# Patient Record
Sex: MALE | Race: WHITE | NOT HISPANIC OR LATINO | URBAN - METROPOLITAN AREA
[De-identification: names, ages, dates, MRNs, and addresses within clinical notes are randomized per-mention and may not be internally consistent; named-entity substitution may affect disease eponyms.]

---

## 2018-07-30 ENCOUNTER — OUTPATIENT (OUTPATIENT)
Dept: OUTPATIENT SERVICES | Facility: HOSPITAL | Age: 78
LOS: 1 days | End: 2018-07-30
Payer: MEDICARE

## 2018-07-30 DIAGNOSIS — I25.10 ATHEROSCLEROTIC HEART DISEASE OF NATIVE CORONARY ARTERY WITHOUT ANGINA PECTORIS: ICD-10-CM

## 2018-07-30 PROCEDURE — 93018 CV STRESS TEST I&R ONLY: CPT

## 2018-07-30 PROCEDURE — 93306 TTE W/DOPPLER COMPLETE: CPT | Mod: 26

## 2018-07-30 PROCEDURE — 93306 TTE W/DOPPLER COMPLETE: CPT

## 2018-07-30 PROCEDURE — A9500: CPT

## 2018-07-30 PROCEDURE — 93017 CV STRESS TEST TRACING ONLY: CPT

## 2018-07-30 PROCEDURE — 78452 HT MUSCLE IMAGE SPECT MULT: CPT

## 2018-07-30 PROCEDURE — 78452 HT MUSCLE IMAGE SPECT MULT: CPT | Mod: 26

## 2018-07-30 PROCEDURE — 93016 CV STRESS TEST SUPVJ ONLY: CPT

## 2018-07-30 PROCEDURE — A9505: CPT

## 2018-09-19 ENCOUNTER — APPOINTMENT (OUTPATIENT)
Dept: CT IMAGING | Facility: HOSPITAL | Age: 78
End: 2018-09-19

## 2018-09-19 ENCOUNTER — OUTPATIENT (OUTPATIENT)
Dept: OUTPATIENT SERVICES | Facility: HOSPITAL | Age: 78
LOS: 1 days | End: 2018-09-19
Payer: COMMERCIAL

## 2018-09-19 PROCEDURE — 75571 CT HRT W/O DYE W/CA TEST: CPT

## 2018-09-19 PROCEDURE — 75571 CT HRT W/O DYE W/CA TEST: CPT | Mod: 26

## 2018-10-18 VITALS
HEART RATE: 60 BPM | OXYGEN SATURATION: 96 % | HEIGHT: 68 IN | DIASTOLIC BLOOD PRESSURE: 61 MMHG | RESPIRATION RATE: 17 BRPM | TEMPERATURE: 98 F | SYSTOLIC BLOOD PRESSURE: 112 MMHG | WEIGHT: 195.11 LBS

## 2018-10-18 NOTE — H&P ADULT - PMH
BPH (benign prostatic hyperplasia)    DM (diabetes mellitus)    HLD (hyperlipidemia)    HTN (hypertension)    Myocardial infarction    Spinal stenosis

## 2018-10-18 NOTE — H&P ADULT - ASSESSMENT
79 y/o M with PMHx HTN, HLD, diet-controlled DM (on no DM meds), CAD with h/o silent MI 25 yrs ago (no PCI) who presents cardiac catheterization secondary to CCS III anginal equivalent symptoms in the setting of an abnormal CTA      ASA III Mallampati III  Precath consented         Risks & benefits of procedure and alternative therapy have been explained to the patient including but not limited to: allergic reaction, bleeding w/possible need for blood transfusion, infection, renal and vascular compromise, limb damage, arrhythmia, stroke, vessel dissection/perforation, Myocardial infarction, emergent CABG. Informed consent obtained and in chart. 79 y/o M with PMHx HTN, HLD, diet-controlled DM (on no DM meds), CAD with h/o silent MI 25 yrs ago (no PCI) who presents cardiac catheterization secondary to CCS III anginal equivalent symptoms in the setting of an abnormal CTA      ASA III Mallampati III  Precath consented   Loaded with Plavix 600mg POx1 and ASA 81mg POx1  Started IVF NS @ 75cc/h         Risks & benefits of procedure and alternative therapy have been explained to the patient including but not limited to: allergic reaction, bleeding w/possible need for blood transfusion, infection, renal and vascular compromise, limb damage, arrhythmia, stroke, vessel dissection/perforation, Myocardial infarction, emergent CABG. Informed consent obtained and in chart.

## 2018-10-18 NOTE — H&P ADULT - HISTORY OF PRESENT ILLNESS
*Confirm meds    79 y/o M with PMHx HTN, HLD, diet-controlled DM (on no DM meds), CAD with h/o silent MI 25 yrs ago (no PCI) who presented to his cardiologist Dr. Garcia complaining of SOB on exertion.   CT Chest performed 9/21/18 showed mild aneurysmal dilatation of the aortic root measuring 4.3cm. CTA coronaries performed 9/19/18 showed calcium score severe at 4337 Agatston units with atherosclerotic disease of the aorta. Echocardiogram performed 7/30/18 showed normal LV wall size and thickness, on some views inferior/inferolateral region appear hypokinetic, normal motion in remaining regions, LVEF 45-50%, mild to moderate MR. Stress test performed 7/30/18 showed moderately abnormal myocardial perfusion images consistent with scarring in the base of the inferior and inferolateral walls, overall LV systolic function normal without regional wall motion abnormalities.   In light of patient’s risk factors, CCS Class III angina equivalent symptoms, and abnormal CTA coronaries with elevated calcium score, the patient is recommended for cardiac catheterization with possible intervention if clinically indicated. *Confirm meds    77 y/o M with PMHx HTN, HLD, diet-controlled DM (on no DM meds), CAD with h/o silent MI 25 yrs ago (no PCI) who presented to his cardiologist Dr. Garcia complaining of SOB on exertion. The patient reports a limited exercise tolerance due to trouble walking secondary to spinal issues, however he reports recently feeling significant SOB after climbing 1 flight of stairs, relieved with rest.  He had CT Chest performed 9/21/18 showed mild aneurysmal dilatation of the aortic root measuring 4.3cm. CTA coronaries performed 9/19/18 showed calcium score severe at 4337 Agatston units with atherosclerotic disease of the aorta. Echocardiogram performed 7/30/18 showed normal LV wall size and thickness, on some views inferior/inferolateral region appear hypokinetic, normal motion in remaining regions, LVEF 45-50%, mild to moderate MR. Stress test performed 7/30/18 showed moderately abnormal myocardial perfusion images consistent with scarring in the base of the inferior and inferolateral walls, overall LV systolic function normal without regional wall motion abnormalities.   In light of patient’s risk factors, CCS Class III angina equivalent symptoms, and abnormal CTA coronaries with elevated calcium score, the patient is recommended for cardiac catheterization with possible intervention if clinically indicated. *Confirm meds    79 y/o M with PMHx HTN, HLD, diet-controlled DM (on no DM meds), CAD with h/o silent MI 25 yrs ago (no PCI) who presented to his cardiologist Dr. Garcia complaining of SOB on exertion. The patient reports a limited exercise tolerance due to trouble walking secondary to spinal issues, however he reports recently feeling significant SOB after climbing 1 flight of stairs, relieved with rest. He denies CP, palpitations, n/v, diaphoresis, dizziness, LE edema, orthopnea, or syncope. He had CT Chest performed 9/21/18 showed mild aneurysmal dilatation of the aortic root measuring 4.3cm. CTA coronaries performed 9/19/18 showed calcium score severe at 4337 Agatston units with atherosclerotic disease of the aorta. Echocardiogram performed 7/30/18 showed normal LV wall size and thickness, on some views inferior/inferolateral region appear hypokinetic, normal motion in remaining regions, LVEF 45-50%, mild to moderate MR. Stress test performed 7/30/18 showed moderately abnormal myocardial perfusion images consistent with scarring in the base of the inferior and inferolateral walls, overall LV systolic function normal without regional wall motion abnormalities.   In light of patient’s risk factors, CCS Class III angina equivalent symptoms, and abnormal CTA coronaries with elevated calcium score, the patient is recommended for cardiac catheterization with possible intervention if clinically indicated. 79 y/o M with PMHx HTN, HLD, diet-controlled DM (on no DM meds), CAD with h/o silent MI 25 yrs ago (no PCI) who presented to his cardiologist Dr. Garcia complaining of SOB on exertion. The patient reports a limited exercise tolerance due to trouble walking secondary to spinal issues, however he reports recently feeling significant SOB after climbing 1 flight of stairs, relieved with rest. He denies CP, palpitations, n/v, diaphoresis, dizziness, LE edema, orthopnea, or syncope. He had CT Chest performed 9/21/18 showed mild aneurysmal dilatation of the aortic root measuring 4.3cm. CTA coronaries performed 9/19/18 showed calcium score severe at 4337 Agatston units with atherosclerotic disease of the aorta. Echocardiogram performed 7/30/18 showed normal LV wall size and thickness, on some views inferior/inferolateral region appear hypokinetic, normal motion in remaining regions, LVEF 45-50%, mild to moderate MR. Stress test performed 7/30/18 showed moderately abnormal myocardial perfusion images consistent with scarring in the base of the inferior and inferolateral walls, overall LV systolic function normal without regional wall motion abnormalities.   In light of patient’s risk factors, CCS Class III angina equivalent symptoms, and abnormal CTA coronaries with elevated calcium score, the patient is recommended for cardiac catheterization with possible intervention if clinically indicated.

## 2018-10-19 ENCOUNTER — INPATIENT (INPATIENT)
Facility: HOSPITAL | Age: 78
LOS: 0 days | Discharge: HOME | DRG: 247 | End: 2018-10-20
Attending: INTERNAL MEDICINE | Admitting: INTERNAL MEDICINE
Payer: COMMERCIAL

## 2018-10-19 DIAGNOSIS — Z90.49 ACQUIRED ABSENCE OF OTHER SPECIFIED PARTS OF DIGESTIVE TRACT: Chronic | ICD-10-CM

## 2018-10-19 DIAGNOSIS — N20.0 CALCULUS OF KIDNEY: Chronic | ICD-10-CM

## 2018-10-19 DIAGNOSIS — J38.1 POLYP OF VOCAL CORD AND LARYNX: Chronic | ICD-10-CM

## 2018-10-19 LAB
ALBUMIN SERPL ELPH-MCNC: 4.5 G/DL — SIGNIFICANT CHANGE UP (ref 3.3–5)
ALP SERPL-CCNC: 59 U/L — SIGNIFICANT CHANGE UP (ref 40–120)
ALT FLD-CCNC: 42 U/L — SIGNIFICANT CHANGE UP (ref 10–45)
ANION GAP SERPL CALC-SCNC: 12 MMOL/L — SIGNIFICANT CHANGE UP (ref 5–17)
APTT BLD: 30.3 SEC — SIGNIFICANT CHANGE UP (ref 27.5–37.4)
AST SERPL-CCNC: 27 U/L — SIGNIFICANT CHANGE UP (ref 10–40)
BASOPHILS NFR BLD AUTO: 0.6 % — SIGNIFICANT CHANGE UP (ref 0–2)
BILIRUB SERPL-MCNC: 0.5 MG/DL — SIGNIFICANT CHANGE UP (ref 0.2–1.2)
BUN SERPL-MCNC: 23 MG/DL — SIGNIFICANT CHANGE UP (ref 7–23)
CALCIUM SERPL-MCNC: 9.8 MG/DL — SIGNIFICANT CHANGE UP (ref 8.4–10.5)
CHLORIDE SERPL-SCNC: 99 MMOL/L — SIGNIFICANT CHANGE UP (ref 96–108)
CHOLEST SERPL-MCNC: 141 MG/DL — SIGNIFICANT CHANGE UP (ref 10–199)
CK MB CFR SERPL CALC: 3.2 NG/ML — SIGNIFICANT CHANGE UP (ref 0–6.7)
CK SERPL-CCNC: 163 U/L — SIGNIFICANT CHANGE UP (ref 30–200)
CO2 SERPL-SCNC: 28 MMOL/L — SIGNIFICANT CHANGE UP (ref 22–31)
CREAT SERPL-MCNC: 0.75 MG/DL — SIGNIFICANT CHANGE UP (ref 0.5–1.3)
CRP SERPL-MCNC: <0.03 MG/DL — SIGNIFICANT CHANGE UP (ref 0–0.4)
EOSINOPHIL NFR BLD AUTO: 7.7 % — HIGH (ref 0–6)
GLUCOSE BLDC GLUCOMTR-MCNC: 96 MG/DL — SIGNIFICANT CHANGE UP (ref 70–99)
GLUCOSE SERPL-MCNC: 158 MG/DL — HIGH (ref 70–99)
HBA1C BLD-MCNC: 6.6 % — HIGH (ref 4–5.6)
HCT VFR BLD CALC: 46.7 % — SIGNIFICANT CHANGE UP (ref 39–50)
HDLC SERPL-MCNC: 52 MG/DL — SIGNIFICANT CHANGE UP
HGB BLD-MCNC: 15.4 G/DL — SIGNIFICANT CHANGE UP (ref 13–17)
INR BLD: 1.02 — SIGNIFICANT CHANGE UP (ref 0.88–1.16)
LIPID PNL WITH DIRECT LDL SERPL: 72 MG/DL — SIGNIFICANT CHANGE UP
LYMPHOCYTES # BLD AUTO: 38.5 % — SIGNIFICANT CHANGE UP (ref 13–44)
MCHC RBC-ENTMCNC: 31.5 PG — SIGNIFICANT CHANGE UP (ref 27–34)
MCHC RBC-ENTMCNC: 33 G/DL — SIGNIFICANT CHANGE UP (ref 32–36)
MCV RBC AUTO: 95.5 FL — SIGNIFICANT CHANGE UP (ref 80–100)
MONOCYTES NFR BLD AUTO: 8.3 % — SIGNIFICANT CHANGE UP (ref 2–14)
NEUTROPHILS NFR BLD AUTO: 44.9 % — SIGNIFICANT CHANGE UP (ref 43–77)
PLATELET # BLD AUTO: 176 K/UL — SIGNIFICANT CHANGE UP (ref 150–400)
POTASSIUM SERPL-MCNC: 4 MMOL/L — SIGNIFICANT CHANGE UP (ref 3.5–5.3)
POTASSIUM SERPL-SCNC: 4 MMOL/L — SIGNIFICANT CHANGE UP (ref 3.5–5.3)
PROT SERPL-MCNC: 7.7 G/DL — SIGNIFICANT CHANGE UP (ref 6–8.3)
PROTHROM AB SERPL-ACNC: 11.3 SEC — SIGNIFICANT CHANGE UP (ref 9.8–12.7)
RBC # BLD: 4.89 M/UL — SIGNIFICANT CHANGE UP (ref 4.2–5.8)
RBC # FLD: 14.1 % — SIGNIFICANT CHANGE UP (ref 10.3–16.9)
SODIUM SERPL-SCNC: 139 MMOL/L — SIGNIFICANT CHANGE UP (ref 135–145)
TOTAL CHOLESTEROL/HDL RATIO MEASUREMENT: 2.7 RATIO — LOW (ref 3.4–9.6)
TRIGL SERPL-MCNC: 84 MG/DL — SIGNIFICANT CHANGE UP (ref 10–149)
WBC # BLD: 4.8 K/UL — SIGNIFICANT CHANGE UP (ref 3.8–10.5)
WBC # FLD AUTO: 4.8 K/UL — SIGNIFICANT CHANGE UP (ref 3.8–10.5)

## 2018-10-19 PROCEDURE — 92928 PRQ TCAT PLMT NTRAC ST 1 LES: CPT | Mod: LC

## 2018-10-19 PROCEDURE — 93010 ELECTROCARDIOGRAM REPORT: CPT

## 2018-10-19 PROCEDURE — 93458 L HRT ARTERY/VENTRICLE ANGIO: CPT | Mod: 26,XU

## 2018-10-19 RX ORDER — ASPIRIN/CALCIUM CARB/MAGNESIUM 324 MG
1 TABLET ORAL
Qty: 0 | Refills: 0 | COMMUNITY

## 2018-10-19 RX ORDER — DEXTROSE 50 % IN WATER 50 %
25 SYRINGE (ML) INTRAVENOUS ONCE
Qty: 0 | Refills: 0 | Status: DISCONTINUED | OUTPATIENT
Start: 2018-10-19 | End: 2018-10-20

## 2018-10-19 RX ORDER — ASPIRIN/CALCIUM CARB/MAGNESIUM 324 MG
81 TABLET ORAL ONCE
Qty: 0 | Refills: 0 | Status: COMPLETED | OUTPATIENT
Start: 2018-10-19 | End: 2018-10-19

## 2018-10-19 RX ORDER — DEXTROSE 50 % IN WATER 50 %
12.5 SYRINGE (ML) INTRAVENOUS ONCE
Qty: 0 | Refills: 0 | Status: DISCONTINUED | OUTPATIENT
Start: 2018-10-19 | End: 2018-10-19

## 2018-10-19 RX ORDER — SODIUM CHLORIDE 9 MG/ML
500 INJECTION INTRAMUSCULAR; INTRAVENOUS; SUBCUTANEOUS
Qty: 0 | Refills: 0 | Status: DISCONTINUED | OUTPATIENT
Start: 2018-10-19 | End: 2018-10-19

## 2018-10-19 RX ORDER — INSULIN LISPRO 100/ML
VIAL (ML) SUBCUTANEOUS
Qty: 0 | Refills: 0 | Status: DISCONTINUED | OUTPATIENT
Start: 2018-10-19 | End: 2018-10-20

## 2018-10-19 RX ORDER — INFLUENZA VIRUS VACCINE 15; 15; 15; 15 UG/.5ML; UG/.5ML; UG/.5ML; UG/.5ML
0.5 SUSPENSION INTRAMUSCULAR ONCE
Qty: 0 | Refills: 0 | Status: DISCONTINUED | OUTPATIENT
Start: 2018-10-19 | End: 2018-10-20

## 2018-10-19 RX ORDER — INSULIN LISPRO 100/ML
VIAL (ML) SUBCUTANEOUS ONCE
Qty: 0 | Refills: 0 | Status: DISCONTINUED | OUTPATIENT
Start: 2018-10-19 | End: 2018-10-19

## 2018-10-19 RX ORDER — GLUCAGON INJECTION, SOLUTION 0.5 MG/.1ML
1 INJECTION, SOLUTION SUBCUTANEOUS ONCE
Qty: 0 | Refills: 0 | Status: DISCONTINUED | OUTPATIENT
Start: 2018-10-19 | End: 2018-10-19

## 2018-10-19 RX ORDER — DEXTROSE 50 % IN WATER 50 %
25 SYRINGE (ML) INTRAVENOUS ONCE
Qty: 0 | Refills: 0 | Status: DISCONTINUED | OUTPATIENT
Start: 2018-10-19 | End: 2018-10-19

## 2018-10-19 RX ORDER — FINASTERIDE 5 MG/1
1 TABLET, FILM COATED ORAL
Qty: 0 | Refills: 0 | COMMUNITY

## 2018-10-19 RX ORDER — SODIUM CHLORIDE 9 MG/ML
1000 INJECTION, SOLUTION INTRAVENOUS
Qty: 0 | Refills: 0 | Status: DISCONTINUED | OUTPATIENT
Start: 2018-10-19 | End: 2018-10-20

## 2018-10-19 RX ORDER — FINASTERIDE 5 MG/1
5 TABLET, FILM COATED ORAL DAILY
Qty: 0 | Refills: 0 | Status: DISCONTINUED | OUTPATIENT
Start: 2018-10-19 | End: 2018-10-20

## 2018-10-19 RX ORDER — CHOLECALCIFEROL (VITAMIN D3) 125 MCG
1 CAPSULE ORAL
Qty: 0 | Refills: 0 | COMMUNITY

## 2018-10-19 RX ORDER — OMEGA-3 ACID ETHYL ESTERS 1 G
1 CAPSULE ORAL
Qty: 0 | Refills: 0 | COMMUNITY

## 2018-10-19 RX ORDER — METOPROLOL TARTRATE 50 MG
25 TABLET ORAL
Qty: 0 | Refills: 0 | Status: DISCONTINUED | OUTPATIENT
Start: 2018-10-19 | End: 2018-10-20

## 2018-10-19 RX ORDER — POTASSIUM CHLORIDE 20 MEQ
1 PACKET (EA) ORAL
Qty: 0 | Refills: 0 | COMMUNITY

## 2018-10-19 RX ORDER — ATORVASTATIN CALCIUM 80 MG/1
80 TABLET, FILM COATED ORAL AT BEDTIME
Qty: 0 | Refills: 0 | Status: DISCONTINUED | OUTPATIENT
Start: 2018-10-19 | End: 2018-10-20

## 2018-10-19 RX ORDER — DEXTROSE 50 % IN WATER 50 %
15 SYRINGE (ML) INTRAVENOUS ONCE
Qty: 0 | Refills: 0 | Status: DISCONTINUED | OUTPATIENT
Start: 2018-10-19 | End: 2018-10-20

## 2018-10-19 RX ORDER — GLUCAGON INJECTION, SOLUTION 0.5 MG/.1ML
1 INJECTION, SOLUTION SUBCUTANEOUS ONCE
Qty: 0 | Refills: 0 | Status: DISCONTINUED | OUTPATIENT
Start: 2018-10-19 | End: 2018-10-20

## 2018-10-19 RX ORDER — SODIUM CHLORIDE 9 MG/ML
500 INJECTION INTRAMUSCULAR; INTRAVENOUS; SUBCUTANEOUS
Qty: 0 | Refills: 0 | Status: DISCONTINUED | OUTPATIENT
Start: 2018-10-19 | End: 2018-10-20

## 2018-10-19 RX ORDER — ASPIRIN/CALCIUM CARB/MAGNESIUM 324 MG
81 TABLET ORAL DAILY
Qty: 0 | Refills: 0 | Status: DISCONTINUED | OUTPATIENT
Start: 2018-10-20 | End: 2018-10-20

## 2018-10-19 RX ORDER — CLOPIDOGREL BISULFATE 75 MG/1
75 TABLET, FILM COATED ORAL DAILY
Qty: 0 | Refills: 0 | Status: DISCONTINUED | OUTPATIENT
Start: 2018-10-20 | End: 2018-10-20

## 2018-10-19 RX ORDER — CLOPIDOGREL BISULFATE 75 MG/1
600 TABLET, FILM COATED ORAL ONCE
Qty: 0 | Refills: 0 | Status: COMPLETED | OUTPATIENT
Start: 2018-10-19 | End: 2018-10-19

## 2018-10-19 RX ORDER — OMEGA-3 ACID ETHYL ESTERS 1 G
2 CAPSULE ORAL
Qty: 0 | Refills: 0 | Status: DISCONTINUED | OUTPATIENT
Start: 2018-10-19 | End: 2018-10-20

## 2018-10-19 RX ORDER — CHLORHEXIDINE GLUCONATE 213 G/1000ML
1 SOLUTION TOPICAL ONCE
Qty: 0 | Refills: 0 | Status: DISCONTINUED | OUTPATIENT
Start: 2018-10-19 | End: 2018-10-19

## 2018-10-19 RX ORDER — DEXTROSE 50 % IN WATER 50 %
15 SYRINGE (ML) INTRAVENOUS ONCE
Qty: 0 | Refills: 0 | Status: DISCONTINUED | OUTPATIENT
Start: 2018-10-19 | End: 2018-10-19

## 2018-10-19 RX ORDER — ATORVASTATIN CALCIUM 80 MG/1
1 TABLET, FILM COATED ORAL
Qty: 0 | Refills: 0 | COMMUNITY

## 2018-10-19 RX ORDER — SODIUM CHLORIDE 9 MG/ML
1000 INJECTION, SOLUTION INTRAVENOUS
Qty: 0 | Refills: 0 | Status: DISCONTINUED | OUTPATIENT
Start: 2018-10-19 | End: 2018-10-19

## 2018-10-19 RX ORDER — CHOLECALCIFEROL (VITAMIN D3) 125 MCG
2000 CAPSULE ORAL
Qty: 0 | Refills: 0 | Status: DISCONTINUED | OUTPATIENT
Start: 2018-10-19 | End: 2018-10-20

## 2018-10-19 RX ORDER — METOPROLOL TARTRATE 50 MG
0 TABLET ORAL
Qty: 0 | Refills: 0 | COMMUNITY

## 2018-10-19 RX ORDER — DEXTROSE 50 % IN WATER 50 %
12.5 SYRINGE (ML) INTRAVENOUS ONCE
Qty: 0 | Refills: 0 | Status: DISCONTINUED | OUTPATIENT
Start: 2018-10-19 | End: 2018-10-20

## 2018-10-19 RX ADMIN — Medication 2000 UNIT(S): at 21:34

## 2018-10-19 RX ADMIN — ATORVASTATIN CALCIUM 80 MILLIGRAM(S): 80 TABLET, FILM COATED ORAL at 21:34

## 2018-10-19 RX ADMIN — Medication 25 MILLIGRAM(S): at 17:44

## 2018-10-19 RX ADMIN — Medication 81 MILLIGRAM(S): at 08:40

## 2018-10-19 RX ADMIN — Medication 2 GRAM(S): at 17:44

## 2018-10-19 RX ADMIN — FINASTERIDE 5 MILLIGRAM(S): 5 TABLET, FILM COATED ORAL at 17:44

## 2018-10-19 RX ADMIN — SODIUM CHLORIDE 75 MILLILITER(S): 9 INJECTION INTRAMUSCULAR; INTRAVENOUS; SUBCUTANEOUS at 08:40

## 2018-10-19 RX ADMIN — CLOPIDOGREL BISULFATE 600 MILLIGRAM(S): 75 TABLET, FILM COATED ORAL at 08:40

## 2018-10-19 NOTE — PROGRESS NOTE ADULT - SUBJECTIVE AND OBJECTIVE BOX
Procedure: [facility], GAMALIEL of OM!, Angioseal  Indication: UA, CAD, +EST  Complication: none    Result:  1) Two vessel CAD (80% OM1, 30% OM2, 50% D1)  2) Normal LV function, EF 55%, LVEDP 14  3) Successful GAMALIEL of OM1 with 2.5x12mm Synergy stent        Plan: Admit given advanced age.  Plavix + ASA x 12 months.  To f/u with Dr. Garcia.

## 2018-10-20 ENCOUNTER — TRANSCRIPTION ENCOUNTER (OUTPATIENT)
Age: 78
End: 2018-10-20

## 2018-10-20 VITALS — TEMPERATURE: 98 F

## 2018-10-20 LAB
ANION GAP SERPL CALC-SCNC: 13 MMOL/L — SIGNIFICANT CHANGE UP (ref 5–17)
BASOPHILS NFR BLD AUTO: 0.5 % — SIGNIFICANT CHANGE UP (ref 0–2)
BUN SERPL-MCNC: 16 MG/DL — SIGNIFICANT CHANGE UP (ref 7–23)
CALCIUM SERPL-MCNC: 9.3 MG/DL — SIGNIFICANT CHANGE UP (ref 8.4–10.5)
CHLORIDE SERPL-SCNC: 100 MMOL/L — SIGNIFICANT CHANGE UP (ref 96–108)
CO2 SERPL-SCNC: 24 MMOL/L — SIGNIFICANT CHANGE UP (ref 22–31)
CREAT SERPL-MCNC: 0.67 MG/DL — SIGNIFICANT CHANGE UP (ref 0.5–1.3)
EOSINOPHIL NFR BLD AUTO: 4.5 % — SIGNIFICANT CHANGE UP (ref 0–6)
GLUCOSE SERPL-MCNC: 112 MG/DL — HIGH (ref 70–99)
HCT VFR BLD CALC: 43.8 % — SIGNIFICANT CHANGE UP (ref 39–50)
HGB BLD-MCNC: 14.3 G/DL — SIGNIFICANT CHANGE UP (ref 13–17)
LYMPHOCYTES # BLD AUTO: 27 % — SIGNIFICANT CHANGE UP (ref 13–44)
MAGNESIUM SERPL-MCNC: 2.1 MG/DL — SIGNIFICANT CHANGE UP (ref 1.6–2.6)
MCHC RBC-ENTMCNC: 31.1 PG — SIGNIFICANT CHANGE UP (ref 27–34)
MCHC RBC-ENTMCNC: 32.6 G/DL — SIGNIFICANT CHANGE UP (ref 32–36)
MCV RBC AUTO: 95.2 FL — SIGNIFICANT CHANGE UP (ref 80–100)
MONOCYTES NFR BLD AUTO: 9 % — SIGNIFICANT CHANGE UP (ref 2–14)
NEUTROPHILS NFR BLD AUTO: 59 % — SIGNIFICANT CHANGE UP (ref 43–77)
PLATELET # BLD AUTO: 158 K/UL — SIGNIFICANT CHANGE UP (ref 150–400)
POTASSIUM SERPL-MCNC: 3.9 MMOL/L — SIGNIFICANT CHANGE UP (ref 3.5–5.3)
POTASSIUM SERPL-SCNC: 3.9 MMOL/L — SIGNIFICANT CHANGE UP (ref 3.5–5.3)
RBC # BLD: 4.6 M/UL — SIGNIFICANT CHANGE UP (ref 4.2–5.8)
RBC # FLD: 14.2 % — SIGNIFICANT CHANGE UP (ref 10.3–16.9)
SODIUM SERPL-SCNC: 137 MMOL/L — SIGNIFICANT CHANGE UP (ref 135–145)
WBC # BLD: 6.2 K/UL — SIGNIFICANT CHANGE UP (ref 3.8–10.5)
WBC # FLD AUTO: 6.2 K/UL — SIGNIFICANT CHANGE UP (ref 3.8–10.5)

## 2018-10-20 PROCEDURE — 80053 COMPREHEN METABOLIC PANEL: CPT

## 2018-10-20 PROCEDURE — 93458 L HRT ARTERY/VENTRICLE ANGIO: CPT

## 2018-10-20 PROCEDURE — 85730 THROMBOPLASTIN TIME PARTIAL: CPT

## 2018-10-20 PROCEDURE — 80048 BASIC METABOLIC PNL TOTAL CA: CPT

## 2018-10-20 PROCEDURE — 82550 ASSAY OF CK (CPK): CPT

## 2018-10-20 PROCEDURE — C1725: CPT

## 2018-10-20 PROCEDURE — C1887: CPT

## 2018-10-20 PROCEDURE — 86140 C-REACTIVE PROTEIN: CPT

## 2018-10-20 PROCEDURE — 90662 IIV NO PRSV INCREASED AG IM: CPT

## 2018-10-20 PROCEDURE — 80061 LIPID PANEL: CPT

## 2018-10-20 PROCEDURE — C1760: CPT

## 2018-10-20 PROCEDURE — 85025 COMPLETE CBC W/AUTO DIFF WBC: CPT

## 2018-10-20 PROCEDURE — 82553 CREATINE MB FRACTION: CPT

## 2018-10-20 PROCEDURE — 83735 ASSAY OF MAGNESIUM: CPT

## 2018-10-20 PROCEDURE — C1874: CPT

## 2018-10-20 PROCEDURE — 99231 SBSQ HOSP IP/OBS SF/LOW 25: CPT | Mod: 25

## 2018-10-20 PROCEDURE — 99239 HOSP IP/OBS DSCHRG MGMT >30: CPT

## 2018-10-20 PROCEDURE — C1889: CPT

## 2018-10-20 PROCEDURE — C9600: CPT

## 2018-10-20 PROCEDURE — 93005 ELECTROCARDIOGRAM TRACING: CPT

## 2018-10-20 PROCEDURE — 83036 HEMOGLOBIN GLYCOSYLATED A1C: CPT

## 2018-10-20 PROCEDURE — C1769: CPT

## 2018-10-20 PROCEDURE — 82962 GLUCOSE BLOOD TEST: CPT

## 2018-10-20 PROCEDURE — 85610 PROTHROMBIN TIME: CPT

## 2018-10-20 PROCEDURE — 36415 COLL VENOUS BLD VENIPUNCTURE: CPT

## 2018-10-20 PROCEDURE — C1894: CPT

## 2018-10-20 RX ORDER — CLOPIDOGREL BISULFATE 75 MG/1
1 TABLET, FILM COATED ORAL
Qty: 30 | Refills: 3 | OUTPATIENT
Start: 2018-10-20 | End: 2019-02-16

## 2018-10-20 RX ORDER — CLOPIDOGREL BISULFATE 75 MG/1
1 TABLET, FILM COATED ORAL
Qty: 30 | Refills: 11 | OUTPATIENT
Start: 2018-10-20 | End: 2019-10-14

## 2018-10-20 RX ORDER — INFLUENZA VIRUS VACCINE 15; 15; 15; 15 UG/.5ML; UG/.5ML; UG/.5ML; UG/.5ML
0.5 SUSPENSION INTRAMUSCULAR ONCE
Qty: 0 | Refills: 0 | Status: COMPLETED | OUTPATIENT
Start: 2018-10-20 | End: 2018-10-20

## 2018-10-20 RX ADMIN — Medication 2000 UNIT(S): at 09:33

## 2018-10-20 RX ADMIN — Medication 81 MILLIGRAM(S): at 11:55

## 2018-10-20 RX ADMIN — Medication 2 GRAM(S): at 06:12

## 2018-10-20 RX ADMIN — CLOPIDOGREL BISULFATE 75 MILLIGRAM(S): 75 TABLET, FILM COATED ORAL at 11:55

## 2018-10-20 RX ADMIN — INFLUENZA VIRUS VACCINE 0.5 MILLILITER(S): 15; 15; 15; 15 SUSPENSION INTRAMUSCULAR at 13:28

## 2018-10-20 RX ADMIN — FINASTERIDE 5 MILLIGRAM(S): 5 TABLET, FILM COATED ORAL at 11:55

## 2018-10-20 RX ADMIN — Medication 25 MILLIGRAM(S): at 06:12

## 2018-10-20 RX ADMIN — Medication 1 TABLET(S): at 11:55

## 2018-10-20 NOTE — DISCHARGE NOTE ADULT - CARE PLAN
Principal Discharge DX:	Coronary artery disease  Goal:	You have blockages in the blood vessels that give blood and oxygen to your heart. This  Secondary Diagnosis:	HTN (hypertension)  Secondary Diagnosis:	HLD (hyperlipidemia) Principal Discharge DX:	Coronary artery disease  Goal:	You have blockages in the blood vessels that give blood and oxygen to your heart. This is called CORONARY ARTERY DISEASE. You had a cardiac catheterization and received a drug eluting stent to your 1st OBTUSE MARGINAL coronary artery. It is VERY IMPORTANT that you take ASPIRIN 81mg once daily and PLAVIX (CLOPIDOGREL) 75mg daily EVERY SINGLE DAY to avoid blood clots forming in your stent. CONTINUE taking LIPITOR (ATORVASTATIN) 80mg once daily to avoid blockages building up in your heart arteries over time.  Assessment and plan of treatment:	Right groin wound care: do not lift objects heavier than 5 lbs for 5 days. Observe for signs of bleeding including bleeding/sudden swelling to your right groin site, new/worsening back pain, or numbness/tingling/pain/coolness to your right leg/foot/toes.  Secondary Diagnosis:	HTN (hypertension)  Secondary Diagnosis:	HLD (hyperlipidemia) Principal Discharge DX:	Coronary artery disease  Goal:	You have blockages in the blood vessels that give blood and oxygen to your heart. This is called CORONARY ARTERY DISEASE. You had a cardiac catheterization and received a drug eluting stent to your 1st OBTUSE MARGINAL coronary artery. It is VERY IMPORTANT that you take ASPIRIN 81mg once daily and PLAVIX (CLOPIDOGREL) 75mg daily EVERY SINGLE DAY to avoid blood clots forming in your stent. CONTINUE taking LIPITOR (ATORVASTATIN) 80mg once daily to avoid blockages building up in your heart arteries over time.  Assessment and plan of treatment:	Right groin wound care: do not lift objects heavier than 5 lbs for 5 days. Observe for signs of bleeding including bleeding/sudden swelling to your right groin site, new/worsening back pain, or numbness/tingling/pain/coolness to your right leg/foot/toes.  Secondary Diagnosis:	HTN (hypertension)  Goal:	Continue taking medications for your blood pressure.  Secondary Diagnosis:	HLD (hyperlipidemia)  Goal:	Continue taking LIPITOR (ATORVASTATIN) 80mg daily at bedtime for cholesterol control.

## 2018-10-20 NOTE — DISCHARGE NOTE ADULT - MEDICATION SUMMARY - MEDICATIONS TO TAKE
I will START or STAY ON the medications listed below when I get home from the hospital:    finasteride 5 mg oral tablet  -- 1 tab(s) by mouth once a day  -- Indication: For Prostate    Ecotrin Adult Low Strength 81 mg oral delayed release tablet  -- 1 tab(s) by mouth once a day  -- Indication: For Coronary artery disease/KEEPING YOUR STENT OPEN    Lipitor 80 mg oral tablet  -- 1 tab(s) by mouth once a day  -- Indication: For Cholesterol control    clopidogrel 75 mg oral tablet  -- 1 tab(s) by mouth once a day  -- Indication: For Coronary artery disease/KEEPING YOUR STENT OPEN    Toprol-XL 25 mg oral tablet, extended release  -- orally 2 times a day  -- Indication: For Blood pressure control    Klor-Con 10 mEq oral tablet, extended release  -- 1 tab(s) by mouth once a day  -- Indication: For Potassium supplement    Omega-3 oral capsule  -- 1 cap(s) by mouth once a day  -- Indication: For Supplement    Multiple Vitamins oral tablet  -- 1 tab(s) by mouth once a day  -- Indication: For Vitamin    Vitamin D3 2000 intl units oral tablet  -- 1 tab(s) by mouth 2 times a day  -- Indication: For Vitamin D

## 2018-10-20 NOTE — DISCHARGE NOTE ADULT - PATIENT PORTAL LINK FT
You can access the [facility] Patient Portal, offered by [facility], by registering with the following website: [email or web address]

## 2018-10-20 NOTE — DISCHARGE NOTE ADULT - CARE PROVIDER_API CALL
Keke Garcia), Cardiovascular Disease; Internal Medicine  [facility] [place] [address]  Phone: [phone]  Fax: [phone]

## 2018-10-20 NOTE — DISCHARGE NOTE ADULT - PLAN OF CARE
You have blockages in the blood vessels that give blood and oxygen to your heart. This You have blockages in the blood vessels that give blood and oxygen to your heart. This is called CORONARY ARTERY DISEASE. You had a cardiac catheterization and received a drug eluting stent to your 1st OBTUSE MARGINAL coronary artery. It is VERY IMPORTANT that you take ASPIRIN 81mg once daily and PLAVIX (CLOPIDOGREL) 75mg daily EVERY SINGLE DAY to avoid blood clots forming in your stent. CONTINUE taking LIPITOR (ATORVASTATIN) 80mg once daily to avoid blockages building up in your heart arteries over time. Right groin wound care: do not lift objects heavier than 5 lbs for 5 days. Observe for signs of bleeding including bleeding/sudden swelling to your right groin site, new/worsening back pain, or numbness/tingling/pain/coolness to your right leg/foot/toes. Continue taking medications for your blood pressure. Continue taking LIPITOR (ATORVASTATIN) 80mg daily at bedtime for cholesterol control.

## 2018-10-20 NOTE — DISCHARGE NOTE ADULT - HOSPITAL COURSE
77 y/o M with PMHx HTN, HLD, diet-controlled DM (on no DM meds), CAD with h/o silent MI 25 yrs ago (no PCI) who presented to his cardiologist Dr. Garcia complaining of SOB on exertion. The patient reports a limited exercise tolerance due to trouble walking secondary to spinal issues, however he reports recently feeling significant SOB after climbing 1 flight of stairs, relieved with rest. He denies CP, palpitations, n/v, diaphoresis, dizziness, LE edema, orthopnea, or syncope. He had CT Chest performed 9/21/18 showed mild aneurysmal dilatation of the aortic root measuring 4.3cm. CTA coronaries performed 9/19/18 showed calcium score severe at 4337 Agatston units with atherosclerotic disease of the aorta. Echocardiogram performed 7/30/18 showed normal LV wall size and thickness, on some views inferior/inferolateral region appear hypokinetic, normal motion in remaining regions, LVEF 45-50%, mild to moderate MR. Stress test performed 7/30/18 showed moderately abnormal myocardial perfusion images consistent with scarring in the base of the inferior and inferolateral walls, overall LV systolic function normal without regional wall motion abnormalities. Pt now S/p Cardiac Cath 10/19/18: GAMALIEL to OM1 (80%); 2VCAD: OM1 80%, OM2 30%, D1 50%, EF 55%, EDP 14mmHg, right groin Angioseal. Continue ASA/Plavix. Patient to follow-up with Dr. Garcia in 1-2 weeks. On 10/19/18 exam pt asymptomatic. VSS and labs stable. Right groin site stable, no bleeding/hemotoma. Distal pulses baseline. Pt will be discharged on Aspirin/Plavix. Lipitor 80mg daily. Metoprolol Succinate ER 25mg daily. Pt stable for discharge.

## 2018-10-20 NOTE — PROGRESS NOTE ADULT - SUBJECTIVE AND OBJECTIVE BOX
INTERVENTIONAL CARDIOLOGY FOLLOW UP NOTE    -Patient seen and examined this am  -No events overnight  -No complaints this am    VASCULAR ACCESS EXAM:     FEMORAL:   2+ right femoral pulse  2+ DP/PT pulses.  -Access site clean, non-tender, without ecchymosis or hematoma.    A/P  77 yo M with now s/p PCI of  OM1 with GAMALIEL via femoral approach with no evidence of vascular complications post procedure.     -continue with current medications including dual antiplatelet therapy   -discharge instructions as per protocol   -outpatient follow up with primary cardiologist

## 2018-10-24 DIAGNOSIS — E78.5 HYPERLIPIDEMIA, UNSPECIFIED: ICD-10-CM

## 2018-10-24 DIAGNOSIS — M48.00 SPINAL STENOSIS, SITE UNSPECIFIED: ICD-10-CM

## 2018-10-24 DIAGNOSIS — I25.2 OLD MYOCARDIAL INFARCTION: ICD-10-CM

## 2018-10-24 DIAGNOSIS — I10 ESSENTIAL (PRIMARY) HYPERTENSION: ICD-10-CM

## 2018-10-24 DIAGNOSIS — N40.0 BENIGN PROSTATIC HYPERPLASIA WITHOUT LOWER URINARY TRACT SYMPTOMS: ICD-10-CM

## 2018-10-24 DIAGNOSIS — I25.110 ATHEROSCLEROTIC HEART DISEASE OF NATIVE CORONARY ARTERY WITH UNSTABLE ANGINA PECTORIS: ICD-10-CM

## 2018-10-24 DIAGNOSIS — E11.9 TYPE 2 DIABETES MELLITUS WITHOUT COMPLICATIONS: ICD-10-CM

## 2019-05-22 PROBLEM — I21.9 ACUTE MYOCARDIAL INFARCTION, UNSPECIFIED: Chronic | Status: ACTIVE | Noted: 2018-10-18

## 2019-05-22 PROBLEM — M48.00 SPINAL STENOSIS, SITE UNSPECIFIED: Chronic | Status: ACTIVE | Noted: 2018-10-18

## 2019-05-22 PROBLEM — I10 ESSENTIAL (PRIMARY) HYPERTENSION: Chronic | Status: ACTIVE | Noted: 2018-10-18

## 2019-05-22 PROBLEM — E11.9 TYPE 2 DIABETES MELLITUS WITHOUT COMPLICATIONS: Chronic | Status: ACTIVE | Noted: 2018-10-18

## 2019-05-22 PROBLEM — E78.5 HYPERLIPIDEMIA, UNSPECIFIED: Chronic | Status: ACTIVE | Noted: 2018-10-18

## 2019-05-22 PROBLEM — N40.0 BENIGN PROSTATIC HYPERPLASIA WITHOUT LOWER URINARY TRACT SYMPTOMS: Chronic | Status: ACTIVE | Noted: 2018-10-18

## 2019-05-28 NOTE — PATIENT PROFILE ADULT - NSPROPTRIGHTBILLOFRIGHTS_GEN_A_NUR
Joanne Del Cid, CRISTOFER  [id number]  [place], [address] [facility]    Office [phone]  Fax [phone]        Wei Pak is a 56 y.o. male who is followed outpatient by Garland Sen and Dr. Nolberto Rosa. Pt is here today with concern of swelling s/p recent SubQ ICD. Assessment  Encounter Diagnoses   Name Primary?  Ischemic cardiomyopathy     Chronic systolic congestive heart failure (HCC)     Essential hypertension     CAD, multiple vessel     Presence of cardiac defibrillator Yes      Recommendations:   Suspected Hematoma s/p subcutaneous ICD 5/20/19   - hold apixapan x3 days; pressure dressing applied   - will also check CBC and start doxycycline 100mg BID x7 days  - prev device check with proper function per Dr Jessica Juares.   - has norco which isn't covering pain; will try percocet; pt advised to use one or the other but not to take in combination  - avoid heavy lifting >5lbs; no shower until f/u      Chronic Anticoagulation s/p LVAD explant  - limited echo 4/17/19 - S/P LVAD explant: cannula/plug noted at apex  - holding eliquis x3 days     Patient understands the plan. All questions were answered to the patient's satisfaction.     Medication Side Effects and Warnings were discussed with patient: yes  Patient Labs were reviewed and or requested:  yes  Patient Past Records were reviewed and or requested: yes     I appreciate the opportunity to be involved in patient's care. Please do not hesitate to contact us with questions or concerns. Subjective:  Cecilia Mari a 56 y. o. male here for follow-up s/p SubQ ICD on 5/20/19. States he was feeling great post procedure with minimal pain / swelling. Notes on Thurs the dressing was gaping, so he changed it with home supplies he had available. Used sterile technique to best of his ability. On the 24th (Friday) he was seen by Dr. Nolberto Rosa for med adjustments and was switched from Warfarin to [place].   On Sunday morning he noted sig pain onset and swelling. States dressing remained dry without obvious draining or bleeding. Has been using Norco 3-4x per day for pain management. On a pain contract with PCP. OK to get opioids from outside provider if PCP notified; pt agrees to call PCP today. Took antibiotics as prescribed with last dose  evening. Denies fever and chills. Home temperature runs low baseline typically 96-97. Blood pressure low which is also baseline on HF meds; he was recently restarted on Entresto therapy by Dr. Pedro Joseph. Denies dizziness or lightheadedness. Cardiac testing  DELLA 2018 - normal LV function and the transplant surgery was aborted. He subsequently was admitted on 2018 for LVAD explant  ECHO 18:LVEF 40-45% mod-severe MR   ECHO 10/15/18:EF 35-40%, mod-severe MR   ECHO 10/31/18: TDS, EF 30-35%, reduced RVEF, TAPSE 1.6, LAE, mild- mod MR/TR  ECHO 19: EF 25-30%, question of thrombus in apex. Mild- mod TR. Consider cardiac MR to exclude left ventricular thrombus. CATH 16: severe LM, and 3 VD with 60% right common iliac stenosis. EF: 10%. ----S/p IABP   ----S/p HeartMate2 LVAD 2016   S/p CAB2016: LIMA-> LAD, SVG-> RCA     CATH 16 following torsades SVG-> RCA TO   ---- s/p BMS x 2-.  RCA  RHC 2017:RA: 1515 14, RV:  14,PA: , m 20, PCWP 13, PVR 1.67 CO 5.67, CI 3    S/p LVAD explant 18 ([facility])    SVT s/p DCCV  AICD  single lead cx by hematoma  AICD explanted 17    Past Medical History:   Diagnosis Date    Arrhythmia     SVT    Arthritis     CAD (coronary artery disease)     Chronic pain     back    Congestive heart failure (HCC)     DSOUZA (dyspnea on exertion) 2019    Gout     Heart failure ([facility]     Hypertension     ICD (implantable cardioverter-defibrillator) in place     Long term current use of anticoagulant therapy     LVAD (left ventricular assist device) present ([facility] 2016    Myocardial infarction ([facility]     Raynaud disease     Seizures (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 14 Tab 0    oxyCODONE-acetaminophen (PERCOCET) 5-325 mg per tablet Take 1 Tab by mouth every six (6) hours as needed for Pain for up to 10 days. Max Daily Amount: 4 Tabs. 30 Tab 0    furosemide (LASIX) 20 mg tablet Take 1 Tab by mouth every other day. 45 Tab 3    sacubitril-valsartan (ENTRESTO) 24 mg/26 mg tablet Take 1 Tab by mouth two (2) times a day. 60 Tab 3    apixaban (ELIQUIS) 5 mg tablet Take 1 Tab by mouth two (2) times a day. 60 Tab 11    levalbuterol tartrate (XOPENEX) 45 mcg/actuation inhaler INHALE 2 PUFFS EVERY 4 HOURS AS NEEDED  0    carvedilol (COREG) 3.125 mg tablet TAKE 1 TABLET BY MOUTH TWICE A DAY WITH MEALS 60 Tab 3    amiodarone (CORDARONE) 200 mg tablet Take 1 Tab by mouth daily. 90 Tab 1    patiromer calcium sorbitex (VELTASSA) 8.4 gram powder Take 8.4 g by mouth daily. 30 Packet 3    HYDROcodone-acetaminophen (NORCO) 5-325 mg per tablet TAKE 1 TABLET EVERY 6 HOURS AS NEEDED  0    DULoxetine (CYMBALTA) 60 mg capsule Take 60 mg by mouth daily.  allopurinol (ZYLOPRIM) 100 mg tablet Take  by mouth daily.  tamsulosin (FLOMAX) 0.4 mg capsule Take 0.4 mg by mouth daily.  atorvastatin (LIPITOR) 40 mg tablet Take 40 mg by mouth daily.  cyclobenzaprine (FLEXERIL) 5 mg tablet Take 5 mg by mouth daily as needed for Muscle Spasm(s).  tadalafil (CIALIS) 10 mg tablet Take 1 Tab by mouth as needed. Indications: Erectile Dysfunction 30 Tab 2    ascorbic acid, vitamin C, (VITAMIN C) 500 mg tablet Take 1 Tab by mouth two (2) times a day. 60 Tab 6    montelukast (SINGULAIR) 10 mg tablet TAKE 1 TABLET BY MOUTH ONCE A DAY  3    colchicine 0.6 mg tablet Take 0.6 mg by mouth every other day.       aspirin 81 mg chewable tablet Take 81 mg by mouth daily. Allergies   Allergen Reactions    Morphine Other (comments)     Hallucinations. Confusion. Impaired judgement    Chlorhexidine Rash       Review of Systems  Constitutional: Negative for fever, chills, malaise/fatigue and diaphoresis. Respiratory: Negative for cough, hemoptysis, sputum production, shortness of breath and wheezing. Cardiovascular: Negative for chest pain, palpitations, claudication, leg swelling and PND. Gastrointestinal: Negative for heartburn, nausea, vomiting, blood in stool and melena. Genitourinary: Negative for dysuria and flank pain. Neurological: Negative for focal weakness, seizures, loss of consciousness, weakness and headaches. Endo/Heme/Allergies: Negative for abnormal bleeding. Psychiatric/Behavioral: Negative for memory loss. Physical Exam    Visit Vitals  BP 90/60 (BP 1 Location: Left arm, BP Patient Position: Sitting)   Pulse 62   Ht 5' 10\" (1.778 m)   Wt 174 lb 9.6 oz (79.2 kg)   SpO2 96%   BMI 25.05 kg/m²     Wt Readings from Last 3 Encounters:   05/28/19 174 lb 9.6 oz (79.2 kg)   05/24/19 176 lb 3.2 oz (79.9 kg)   05/21/19 181 lb 14.1 oz (82.5 kg)      General - well developed well nourished, A&O  Neck - no JVD, neck supple  Cardiac - normal S1, S2, RRR, 2/6 systolic murmur, no rubs or gallops. No clicks  Vascular - radials and pedal pulses equal bilateral  Lungs - slightly diminished LLL; otherwise clear, no rales, wheezing or rhonchi  Abd - soft nontender, non-distended, +BS  Extremities - no edema, warm, well perfused  Skin - dressing c/d/i at xiphoid region and left axillary- mod swelling / hematoma left axillary. Tender.  Dressing removed with incision healing well; appropriate scabbing w/ sutures; no erythema or drainage  Neuro - nonfocal  Psych - normal mood and affect    Labs    Component      Latest Ref Rng & Units 5/24/2019 5/23/2019 5/23/2019 5/23/2019          12:00 AM  3:29 PM  3:29 PM  3:29 PM   Glucose      65 - 99 mg/dL  77 BUN      8 - 27 mg/dL  37 (H)     Creatinine      0.76 - 1.27 mg/dL  1.49 (H)     GFR est non-AA      >59 mL/min/1.73  50 (L)     GFR est AA      >59 mL/min/1.73  57 (L)     BUN/Creatinine ratio      10 - 24  25 (H)     Sodium      134 - 144 mmol/L  135     Potassium      3.5 - 5.2 mmol/L  4.8     Chloride      96 - 106 mmol/L  102     CO2      20 - 29 mmol/L  21     Calcium      8.6 - 10.2 mg/dL  9.0     Protein, total      6.0 - 8.5 g/dL  6.5     Albumin      3.6 - 4.8 g/dL  4.1     GLOBULIN, TOTAL      1.5 - 4.5 g/dL  2.4     A-G Ratio      1.2 - 2.2  1.7     Bilirubin, total      0.0 - 1.2 mg/dL  0.6     Alk.  phosphatase      39 - 117 IU/L  78     AST      0 - 40 IU/L  21     ALT (SGPT)      0 - 44 IU/L  11     WBC      3.4 - 10.8 x10E3/uL    9.1   RBC      4.14 - 5.80 x10E6/uL    3.65 (L)   HGB      13.0 - 17.7 g/dL    10.4 (L)   HCT      37.5 - 51.0 %    29.7 (L)   MCV      79 - 97 fL    81   MCH      26.6 - 33.0 pg    28.5   MCHC      31.5 - 35.7 g/dL    35.0   RDW      12.3 - 15.4 %    16.4 (H)   PLATELET      116 - 194 x10E3/uL    267   NT pro-BNP      0 - 210 pg/mL   14,130 (H)    Magnesium      1.6 - 2.3 mg/dL 1.9        Tommie Orts, ANP patient

## 2019-06-06 ENCOUNTER — APPOINTMENT (OUTPATIENT)
Dept: VASCULAR SURGERY | Facility: CLINIC | Age: 79
End: 2019-06-06
Payer: MEDICARE

## 2019-06-06 PROCEDURE — 93880 EXTRACRANIAL BILAT STUDY: CPT

## 2019-06-07 ENCOUNTER — APPOINTMENT (OUTPATIENT)
Dept: VASCULAR SURGERY | Facility: CLINIC | Age: 79
End: 2019-06-07
Payer: MEDICARE

## 2019-06-07 VITALS — HEART RATE: 58 BPM | DIASTOLIC BLOOD PRESSURE: 80 MMHG | SYSTOLIC BLOOD PRESSURE: 145 MMHG

## 2019-06-07 DIAGNOSIS — Z86.39 PERSONAL HISTORY OF OTHER ENDOCRINE, NUTRITIONAL AND METABOLIC DISEASE: ICD-10-CM

## 2019-06-07 DIAGNOSIS — Z86.73 PERSONAL HISTORY OF TRANSIENT ISCHEMIC ATTACK (TIA), AND CEREBRAL INFARCTION W/OUT RESIDUAL DEFICITS: ICD-10-CM

## 2019-06-07 DIAGNOSIS — Z78.9 OTHER SPECIFIED HEALTH STATUS: ICD-10-CM

## 2019-06-07 DIAGNOSIS — I77.9 DISORDER OF ARTERIES AND ARTERIOLES, UNSPECIFIED: ICD-10-CM

## 2019-06-07 PROCEDURE — 99205 OFFICE O/P NEW HI 60 MIN: CPT

## 2019-06-08 ENCOUNTER — FORM ENCOUNTER (OUTPATIENT)
Age: 79
End: 2019-06-08

## 2019-06-09 ENCOUNTER — TRANSCRIPTION ENCOUNTER (OUTPATIENT)
Age: 79
End: 2019-06-09

## 2019-06-09 ENCOUNTER — OUTPATIENT (OUTPATIENT)
Dept: OUTPATIENT SERVICES | Facility: HOSPITAL | Age: 79
LOS: 1 days | End: 2019-06-09
Payer: MEDICARE

## 2019-06-09 ENCOUNTER — APPOINTMENT (OUTPATIENT)
Dept: CT IMAGING | Facility: HOSPITAL | Age: 79
End: 2019-06-09
Payer: MEDICARE

## 2019-06-09 DIAGNOSIS — J38.1 POLYP OF VOCAL CORD AND LARYNX: Chronic | ICD-10-CM

## 2019-06-09 DIAGNOSIS — N20.0 CALCULUS OF KIDNEY: Chronic | ICD-10-CM

## 2019-06-09 DIAGNOSIS — Z90.49 ACQUIRED ABSENCE OF OTHER SPECIFIED PARTS OF DIGESTIVE TRACT: Chronic | ICD-10-CM

## 2019-06-09 PROCEDURE — 71275 CT ANGIOGRAPHY CHEST: CPT | Mod: 26

## 2019-06-09 PROCEDURE — 71275 CT ANGIOGRAPHY CHEST: CPT

## 2019-06-11 PROBLEM — Z86.73 HISTORY OF STROKE: Status: RESOLVED | Noted: 2019-06-11 | Resolved: 2019-06-11

## 2019-06-11 PROBLEM — Z78.9 NON-SMOKER: Status: ACTIVE | Noted: 2019-06-11

## 2019-06-11 PROBLEM — Z78.9 SOCIAL ALCOHOL USE: Status: ACTIVE | Noted: 2019-06-11

## 2019-06-11 PROBLEM — Z86.39 HISTORY OF HYPERLIPIDEMIA: Status: RESOLVED | Noted: 2019-06-11 | Resolved: 2019-06-11

## 2019-06-13 NOTE — HISTORY OF PRESENT ILLNESS
[FreeTextEntry1] : 77 yo M with PMHx of DM, HLD, hx of CVA who was referred by his neurologist, Dr. Hillman to be evaluated for Carotid artery disease. Patient had Carotid doppler yesterday and it demonstrated bilateral less than 50% ICA stenosis. Left ICA demonstrated homogeneous plaque with irregular borders in the carotid bulb and proximal ICA. Velocities are consistent with absence of hemodynamically significant stenosis. Patient denies dizziness, lightheadedness, LOS, neurologic deficits.

## 2019-06-13 NOTE — PHYSICAL EXAM
[Carotid Bruits] : carotid bruit  [Normal Breath Sounds] : Normal breath sounds [Normal Heart Sounds] : normal heart sounds [2+] : left 2+ [Left Carotid Bruit] : left carotid bruit heard [Alert] : alert [No Rash or Lesion] : No rash or lesion [Calm] : calm [JVD] : no jugular venous distention  [Abdomen Tenderness] : ~T ~M No abdominal tenderness [Ankle Swelling (On Exam)] : not present [Right Carotid Bruit] : no bruit heard over the right carotid [de-identified] : NC/AT [de-identified] : supple [de-identified] : WN/WD, NAD

## 2019-06-13 NOTE — ASSESSMENT
[FreeTextEntry1] : 77 yo M with PMHx of DM, HLD, hx of CVA who was referred by his neurologist, Dr. Hillman to be evaluated for Carotid artery disease.\par Carotid duplex was reviewed.\par There is a homogeneous plaque with irregular borders in the carotid bulb and proximal  left ICA. Velocities are consistent with absence of hemodynamically significant stenosis.\par We will obtain CTA of chest to look at aortic arch and see if there is any unstable plaque that might be responsible for patient's stroke.\par We will discuss after CTA of chest results available.

## 2019-06-26 ENCOUNTER — APPOINTMENT (OUTPATIENT)
Dept: OTOLARYNGOLOGY | Facility: CLINIC | Age: 79
End: 2019-06-26

## 2019-07-09 ENCOUNTER — OUTPATIENT (OUTPATIENT)
Dept: OUTPATIENT SERVICES | Facility: HOSPITAL | Age: 79
LOS: 1 days | End: 2019-07-09
Payer: MEDICARE

## 2019-07-09 DIAGNOSIS — J38.1 POLYP OF VOCAL CORD AND LARYNX: Chronic | ICD-10-CM

## 2019-07-09 DIAGNOSIS — N20.0 CALCULUS OF KIDNEY: Chronic | ICD-10-CM

## 2019-07-09 DIAGNOSIS — I67.89 OTHER CEREBROVASCULAR DISEASE: ICD-10-CM

## 2019-07-09 DIAGNOSIS — Z90.49 ACQUIRED ABSENCE OF OTHER SPECIFIED PARTS OF DIGESTIVE TRACT: Chronic | ICD-10-CM

## 2019-07-09 PROCEDURE — 93306 TTE W/DOPPLER COMPLETE: CPT | Mod: 26

## 2019-07-09 PROCEDURE — 93306 TTE W/DOPPLER COMPLETE: CPT

## 2021-01-13 ENCOUNTER — NON-APPOINTMENT (OUTPATIENT)
Age: 81
End: 2021-01-13

## 2021-01-13 ENCOUNTER — APPOINTMENT (OUTPATIENT)
Dept: OPHTHALMOLOGY | Facility: CLINIC | Age: 81
End: 2021-01-13
Payer: MEDICARE

## 2021-01-13 PROCEDURE — 92004 COMPRE OPH EXAM NEW PT 1/>: CPT

## 2021-01-13 PROCEDURE — 92250 FUNDUS PHOTOGRAPHY W/I&R: CPT

## 2021-10-02 ENCOUNTER — NON-APPOINTMENT (OUTPATIENT)
Age: 81
End: 2021-10-02

## 2021-10-13 ENCOUNTER — APPOINTMENT (OUTPATIENT)
Dept: HEART AND VASCULAR | Facility: CLINIC | Age: 81
End: 2021-10-13
Payer: MEDICARE

## 2021-10-13 ENCOUNTER — NON-APPOINTMENT (OUTPATIENT)
Age: 81
End: 2021-10-13

## 2021-10-13 VITALS
DIASTOLIC BLOOD PRESSURE: 74 MMHG | OXYGEN SATURATION: 98 % | WEIGHT: 189 LBS | TEMPERATURE: 98 F | HEART RATE: 59 BPM | HEIGHT: 69 IN | BODY MASS INDEX: 27.99 KG/M2 | SYSTOLIC BLOOD PRESSURE: 125 MMHG

## 2021-10-13 DIAGNOSIS — I71.2 THORACIC AORTIC ANEURYSM, W/OUT RUPTURE: ICD-10-CM

## 2021-10-13 DIAGNOSIS — I25.10 ATHEROSCLEROTIC HEART DISEASE OF NATIVE CORONARY ARTERY W/OUT ANGINA PECTORIS: ICD-10-CM

## 2021-10-13 DIAGNOSIS — I10 ESSENTIAL (PRIMARY) HYPERTENSION: ICD-10-CM

## 2021-10-13 DIAGNOSIS — I48.0 PAROXYSMAL ATRIAL FIBRILLATION: ICD-10-CM

## 2021-10-13 DIAGNOSIS — R35.1 BENIGN PROSTATIC HYPERPLASIA WITH LOWER URINARY TRACT SYMPMS: ICD-10-CM

## 2021-10-13 DIAGNOSIS — N40.1 BENIGN PROSTATIC HYPERPLASIA WITH LOWER URINARY TRACT SYMPMS: ICD-10-CM

## 2021-10-13 DIAGNOSIS — R93.1 ABNORMAL FINDINGS ON DIAGNOSTIC IMAGING OF HEART AND CORONARY CIRCULATION: ICD-10-CM

## 2021-10-13 DIAGNOSIS — Z83.3 FAMILY HISTORY OF DIABETES MELLITUS: ICD-10-CM

## 2021-10-13 DIAGNOSIS — E78.00 PURE HYPERCHOLESTEROLEMIA, UNSPECIFIED: ICD-10-CM

## 2021-10-13 DIAGNOSIS — Z80.9 FAMILY HISTORY OF MALIGNANT NEOPLASM, UNSPECIFIED: ICD-10-CM

## 2021-10-13 PROCEDURE — 99214 OFFICE O/P EST MOD 30 MIN: CPT

## 2021-10-13 PROCEDURE — 36415 COLL VENOUS BLD VENIPUNCTURE: CPT

## 2021-10-13 PROCEDURE — 93000 ELECTROCARDIOGRAM COMPLETE: CPT

## 2021-10-13 RX ORDER — METOPROLOL SUCCINATE 25 MG/1
25 TABLET, EXTENDED RELEASE ORAL
Qty: 180 | Refills: 1 | Status: ACTIVE | COMMUNITY

## 2021-10-13 RX ORDER — SERTRALINE HYDROCHLORIDE 50 MG/1
50 TABLET, FILM COATED ORAL
Qty: 90 | Refills: 3 | Status: ACTIVE | COMMUNITY
Start: 2021-10-13

## 2021-10-13 RX ORDER — FUROSEMIDE 10 MG/ML
10 SOLUTION ORAL DAILY
Refills: 0 | Status: DISCONTINUED | COMMUNITY
End: 2021-10-13

## 2021-10-13 RX ORDER — FINASTERIDE 5 MG/1
5 TABLET, FILM COATED ORAL DAILY
Qty: 90 | Refills: 3 | Status: ACTIVE | COMMUNITY
Start: 2021-10-13

## 2021-10-13 RX ORDER — APIXABAN 5 MG/1
5 TABLET, FILM COATED ORAL
Qty: 180 | Refills: 3 | Status: ACTIVE | COMMUNITY

## 2021-10-13 RX ORDER — ATORVASTATIN CALCIUM 80 MG/1
80 TABLET, FILM COATED ORAL DAILY
Qty: 90 | Refills: 3 | Status: ACTIVE | COMMUNITY

## 2021-10-13 NOTE — HISTORY OF PRESENT ILLNESS
[FreeTextEntry1] : Psych- Dr Buchanan\par -\par Ophtho\par Neuro- Dr Phoenix\par EEP- Dr Painting\par

## 2021-10-13 NOTE — ASSESSMENT
[FreeTextEntry1] : ASHD- CCS > 4300, remote MI, OM stent in 2018.  CP free, no LINDO.\par \par Fatigue- will check labs, I suspect depression.  Will consider a Pharm Nuc as his back pain precludes exercise.\par \par HTN- controlled\par \par PAF- followed by Dr Painting\par \par HLD- continue high  dose Lipitor\par \par Hyperglycemia- Not on Meds, A1c sent.   Labs were drawn today in Office. 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

## 2021-10-13 NOTE — REVIEW OF SYSTEMS
[Negative] : Heme/Lymph [Feeling Fatigued] : feeling fatigued [Joint Pain] : joint pain [FreeTextEntry3] : catAracts [FreeTextEntry9] : Back

## 2021-10-13 NOTE — REASON FOR VISIT
[FreeTextEntry1] : 80 y/o M with PMHx HTN, HLD, diet-controlled DM (on no DM meds), CAD with h/o \par silent MI 25 yrs ago (no PCI) who presented IN 2018 to his Cardiologist Dr. Garcia \par complaining of SOB on exertion. The patient reports a limited exercise \par tolerance due to trouble walking secondary to spinal issues, however he reports \par recently feeling significant SOB after climbing 1 flight of stairs, relieved \par with rest. He denies CP, palpitations, n/v, diaphoresis, dizziness, LE edema, \par orthopnea, or syncope. He had CT Chest performed 9/21/18 showed mild aneurysmal \par dilatation of the aortic root measuring 4.3cm. CTA coronaries performed 9/19/18 \par showed calcium score severe at 4337 Agatston units with atherosclerotic disease \par of the aorta. Echocardiogram performed 7/30/18 showed normal LV wall size and \par thickness, on some views inferior/inferolateral region appear hypokinetic, \par normal motion in remaining regions, LVEF 45-50%, mild to moderate MR. Stress \par test performed 7/30/18 showed moderately abnormal myocardial perfusion images \par consistent with scarring in the base of the inferior and inferolateral walls, \par overall LV systolic function normal without regional wall motion abnormalities. \par Pt now S/p Cardiac Cath 10/19/18: GAMALIEL to OM1 (80%); 2VCAD: OM1 80%, OM2 30%, D1 \par 50%, EF 55%, EDP 14mmHg, \par \par 10/13/21  Pt here to establish care after Dr Garcia left.  Pt has PAF and just had an ILR changed by Dr Painting.  Pt c/o fatigue. no CP.  This began in 2021, pt vague about it.  It does not include LINDO.  Pt also depressed and saw Dr Buchanan who started Zoloft. Does not sleep well. \par \par \par NSR Tall Rw in V1, probable RBBB

## 2021-10-15 LAB
ALBUMIN SERPL ELPH-MCNC: 4.5 G/DL
ALP BLD-CCNC: 76 U/L
ALT SERPL-CCNC: 41 U/L
ANION GAP SERPL CALC-SCNC: 11 MMOL/L
APPEARANCE: CLEAR
AST SERPL-CCNC: 27 U/L
BACTERIA: NEGATIVE
BASOPHILS # BLD AUTO: 0.07 K/UL
BASOPHILS NFR BLD AUTO: 1.1 %
BILIRUB SERPL-MCNC: 0.6 MG/DL
BILIRUBIN URINE: NEGATIVE
BLOOD URINE: NEGATIVE
BUN SERPL-MCNC: 16 MG/DL
CALCIUM SERPL-MCNC: 9.6 MG/DL
CHLORIDE SERPL-SCNC: 103 MMOL/L
CHOLEST SERPL-MCNC: 135 MG/DL
CO2 SERPL-SCNC: 24 MMOL/L
COLOR: YELLOW
CREAT SERPL-MCNC: 0.7 MG/DL
EOSINOPHIL # BLD AUTO: 0.25 K/UL
EOSINOPHIL NFR BLD AUTO: 4 %
ESTIMATED AVERAGE GLUCOSE: 137 MG/DL
GLUCOSE QUALITATIVE U: NEGATIVE
GLUCOSE SERPL-MCNC: 123 MG/DL
HBA1C MFR BLD HPLC: 6.4 %
HCT VFR BLD CALC: 46.5 %
HDLC SERPL-MCNC: 50 MG/DL
HGB BLD-MCNC: 14.7 G/DL
HYALINE CASTS: 0 /LPF
IMM GRANULOCYTES NFR BLD AUTO: 0.2 %
KETONES URINE: NEGATIVE
LDLC SERPL CALC-MCNC: 70 MG/DL
LEUKOCYTE ESTERASE URINE: NEGATIVE
LYMPHOCYTES # BLD AUTO: 1.42 K/UL
LYMPHOCYTES NFR BLD AUTO: 22.7 %
MAN DIFF?: NORMAL
MCHC RBC-ENTMCNC: 31.6 GM/DL
MCHC RBC-ENTMCNC: 31.8 PG
MCV RBC AUTO: 100.6 FL
MICROSCOPIC-UA: NORMAL
MONOCYTES # BLD AUTO: 0.51 K/UL
MONOCYTES NFR BLD AUTO: 8.2 %
NEUTROPHILS # BLD AUTO: 3.99 K/UL
NEUTROPHILS NFR BLD AUTO: 63.8 %
NITRITE URINE: NEGATIVE
NONHDLC SERPL-MCNC: 85 MG/DL
PH URINE: 6
PLATELET # BLD AUTO: 211 K/UL
POTASSIUM SERPL-SCNC: 4.6 MMOL/L
PROT SERPL-MCNC: 7.1 G/DL
PROTEIN URINE: NEGATIVE
RBC # BLD: 4.62 M/UL
RBC # FLD: 15 %
RED BLOOD CELLS URINE: 1 /HPF
SODIUM SERPL-SCNC: 139 MMOL/L
SPECIFIC GRAVITY URINE: 1.02
SQUAMOUS EPITHELIAL CELLS: 0 /HPF
TRIGL SERPL-MCNC: 74 MG/DL
TSH SERPL-ACNC: 1.7 UIU/ML
UROBILINOGEN URINE: NORMAL
WBC # FLD AUTO: 6.25 K/UL
WHITE BLOOD CELLS URINE: 0 /HPF

## 2021-12-03 ENCOUNTER — APPOINTMENT (OUTPATIENT)
Dept: HEART AND VASCULAR | Facility: CLINIC | Age: 81
End: 2021-12-03

## 2021-12-09 ENCOUNTER — APPOINTMENT (OUTPATIENT)
Dept: HEART AND VASCULAR | Facility: CLINIC | Age: 81
End: 2021-12-09

## 2021-12-14 ENCOUNTER — APPOINTMENT (OUTPATIENT)
Dept: HEART AND VASCULAR | Facility: CLINIC | Age: 81
End: 2021-12-14

## 2022-02-05 ENCOUNTER — OUTPATIENT (OUTPATIENT)
Dept: OUTPATIENT SERVICES | Facility: HOSPITAL | Age: 82
LOS: 1 days | End: 2022-02-05
Payer: MEDICARE

## 2022-02-05 ENCOUNTER — APPOINTMENT (OUTPATIENT)
Dept: MRI IMAGING | Facility: HOSPITAL | Age: 82
End: 2022-02-05

## 2022-02-05 DIAGNOSIS — Z90.49 ACQUIRED ABSENCE OF OTHER SPECIFIED PARTS OF DIGESTIVE TRACT: Chronic | ICD-10-CM

## 2022-02-05 DIAGNOSIS — J38.1 POLYP OF VOCAL CORD AND LARYNX: Chronic | ICD-10-CM

## 2022-02-05 DIAGNOSIS — N20.0 CALCULUS OF KIDNEY: Chronic | ICD-10-CM

## 2022-02-05 PROCEDURE — 70551 MRI BRAIN STEM W/O DYE: CPT | Mod: 26,MH

## 2022-02-05 PROCEDURE — 70551 MRI BRAIN STEM W/O DYE: CPT | Mod: MH

## 2022-03-09 ENCOUNTER — APPOINTMENT (OUTPATIENT)
Dept: OTOLARYNGOLOGY | Facility: CLINIC | Age: 82
End: 2022-03-09
Payer: MEDICARE

## 2022-03-09 DIAGNOSIS — R43.0 ANOSMIA: ICD-10-CM

## 2022-03-09 DIAGNOSIS — H91.13 PRESBYCUSIS, BILATERAL: ICD-10-CM

## 2022-03-09 DIAGNOSIS — R26.89 OTHER ABNORMALITIES OF GAIT AND MOBILITY: ICD-10-CM

## 2022-03-09 DIAGNOSIS — H90.3 SENSORINEURAL HEARING LOSS, BILATERAL: ICD-10-CM

## 2022-03-09 PROCEDURE — 31231 NASAL ENDOSCOPY DX: CPT

## 2022-03-09 PROCEDURE — 99203 OFFICE O/P NEW LOW 30 MIN: CPT | Mod: 25

## 2022-03-09 PROCEDURE — 92567 TYMPANOMETRY: CPT

## 2022-03-09 PROCEDURE — 92557 COMPREHENSIVE HEARING TEST: CPT

## 2022-03-09 RX ORDER — CLOPIDOGREL 75 MG/1
75 TABLET, FILM COATED ORAL DAILY
Refills: 0 | Status: DISCONTINUED | COMMUNITY
End: 2022-03-09

## 2022-03-19 ENCOUNTER — OUTPATIENT (OUTPATIENT)
Dept: OUTPATIENT SERVICES | Facility: HOSPITAL | Age: 82
LOS: 1 days | End: 2022-03-19
Payer: MEDICARE

## 2022-03-19 ENCOUNTER — APPOINTMENT (OUTPATIENT)
Dept: CT IMAGING | Facility: HOSPITAL | Age: 82
End: 2022-03-19

## 2022-03-19 DIAGNOSIS — Z90.49 ACQUIRED ABSENCE OF OTHER SPECIFIED PARTS OF DIGESTIVE TRACT: Chronic | ICD-10-CM

## 2022-03-19 DIAGNOSIS — N20.0 CALCULUS OF KIDNEY: Chronic | ICD-10-CM

## 2022-03-19 DIAGNOSIS — J38.1 POLYP OF VOCAL CORD AND LARYNX: Chronic | ICD-10-CM

## 2022-03-19 PROCEDURE — G1004: CPT

## 2022-03-19 PROCEDURE — 70486 CT MAXILLOFACIAL W/O DYE: CPT | Mod: MG

## 2022-03-19 PROCEDURE — 70486 CT MAXILLOFACIAL W/O DYE: CPT | Mod: 26,MG

## 2022-04-06 ENCOUNTER — NON-APPOINTMENT (OUTPATIENT)
Age: 82
End: 2022-04-06

## 2022-04-06 ENCOUNTER — APPOINTMENT (OUTPATIENT)
Dept: OPHTHALMOLOGY | Facility: CLINIC | Age: 82
End: 2022-04-06
Payer: MEDICARE

## 2022-04-06 PROCEDURE — 92012 INTRM OPH EXAM EST PATIENT: CPT

## 2022-08-27 ENCOUNTER — OUTPATIENT (OUTPATIENT)
Dept: OUTPATIENT SERVICES | Facility: HOSPITAL | Age: 82
LOS: 1 days | End: 2022-08-27
Payer: MEDICARE

## 2022-08-27 ENCOUNTER — APPOINTMENT (OUTPATIENT)
Dept: MRI IMAGING | Facility: HOSPITAL | Age: 82
End: 2022-08-27

## 2022-08-27 DIAGNOSIS — N20.0 CALCULUS OF KIDNEY: Chronic | ICD-10-CM

## 2022-08-27 DIAGNOSIS — J38.1 POLYP OF VOCAL CORD AND LARYNX: Chronic | ICD-10-CM

## 2022-08-27 DIAGNOSIS — Z90.49 ACQUIRED ABSENCE OF OTHER SPECIFIED PARTS OF DIGESTIVE TRACT: Chronic | ICD-10-CM

## 2022-08-27 PROCEDURE — 70551 MRI BRAIN STEM W/O DYE: CPT | Mod: 26,MH

## 2022-08-27 PROCEDURE — 70551 MRI BRAIN STEM W/O DYE: CPT | Mod: MH

## 2024-07-25 ENCOUNTER — APPOINTMENT (OUTPATIENT)
Dept: OPHTHALMOLOGY | Facility: CLINIC | Age: 84
End: 2024-07-25
Payer: MEDICARE

## 2024-07-25 ENCOUNTER — NON-APPOINTMENT (OUTPATIENT)
Age: 84
End: 2024-07-25

## 2024-07-25 PROCEDURE — 92136 OPHTHALMIC BIOMETRY: CPT

## 2024-07-25 PROCEDURE — 92014 COMPRE OPH EXAM EST PT 1/>: CPT

## 2024-07-25 PROCEDURE — 92134 CPTRZ OPH DX IMG PST SGM RTA: CPT

## 2025-07-24 ENCOUNTER — APPOINTMENT (OUTPATIENT)
Dept: OPHTHALMOLOGY | Facility: CLINIC | Age: 85
End: 2025-07-24
Payer: MEDICARE

## 2025-07-24 ENCOUNTER — NON-APPOINTMENT (OUTPATIENT)
Age: 85
End: 2025-07-24

## 2025-07-24 PROCEDURE — 92014 COMPRE OPH EXAM EST PT 1/>: CPT
